# Patient Record
Sex: MALE | Race: OTHER | NOT HISPANIC OR LATINO
[De-identification: names, ages, dates, MRNs, and addresses within clinical notes are randomized per-mention and may not be internally consistent; named-entity substitution may affect disease eponyms.]

---

## 2018-05-12 PROBLEM — Z00.00 ENCOUNTER FOR PREVENTIVE HEALTH EXAMINATION: Status: ACTIVE | Noted: 2018-05-12

## 2018-06-12 ENCOUNTER — APPOINTMENT (OUTPATIENT)
Dept: UROLOGY | Facility: CLINIC | Age: 73
End: 2018-06-12
Payer: MEDICARE

## 2018-06-12 VITALS
SYSTOLIC BLOOD PRESSURE: 146 MMHG | HEART RATE: 90 BPM | WEIGHT: 195 LBS | DIASTOLIC BLOOD PRESSURE: 99 MMHG | BODY MASS INDEX: 29.55 KG/M2 | HEIGHT: 68 IN

## 2018-06-12 DIAGNOSIS — Z78.9 OTHER SPECIFIED HEALTH STATUS: ICD-10-CM

## 2018-06-12 PROCEDURE — 99203 OFFICE O/P NEW LOW 30 MIN: CPT

## 2018-06-12 RX ORDER — MEFLOQUINE HYDROCHLORIDE 250 MG/1
250 TABLET ORAL
Qty: 8 | Refills: 0 | Status: ACTIVE | COMMUNITY
Start: 2017-11-15

## 2018-06-12 RX ORDER — AMOXICILLIN 500 MG/1
500 CAPSULE ORAL
Qty: 21 | Refills: 0 | Status: COMPLETED | COMMUNITY
Start: 2018-05-21

## 2018-06-12 RX ORDER — FINASTERIDE 5 MG/1
5 TABLET, FILM COATED ORAL
Qty: 90 | Refills: 0 | Status: COMPLETED | COMMUNITY
Start: 2017-11-03

## 2018-06-12 RX ORDER — TAMSULOSIN HYDROCHLORIDE 0.4 MG/1
0.4 CAPSULE ORAL
Qty: 90 | Refills: 0 | Status: COMPLETED | COMMUNITY
Start: 2017-11-03

## 2018-06-12 NOTE — LETTER HEADER
[Rubia Benites MD] : Rubia Benites MD [Director of Urology] : Director of Urology [[address]] : [address] [[address]] : [address] [[place] [address]] : [place] [address] [Tel [phone]] : Tel [phone] [Fax [phone]] : Fax [phone]

## 2018-06-12 NOTE — LETTER BODY
[Dear  ___] : Dear  [unfilled], [Consult Letter:] : I had the pleasure of evaluating your patient, [unfilled]. [Please see my note below.] : Please see my note below. [Consult Closing:] : Thank you very much for allowing me to participate in the care of this patient.  If you have any questions, please do not hesitate to contact me. [Sincerely,] : Sincerely, [FreeTextEntry2] : Ramone Olmstead MD\par [address]\[place] [address]\par

## 2018-06-12 NOTE — ASSESSMENT
[FreeTextEntry1] : Upon exam I find him to be in fair shape and spirits. The genitalia appeared acceptable, the prostate was he enormous I could not get a finger over the top. He has acceptable rectal tone and the BC reflex was within normal limits. Both testicles are atrophic but as above he had a normal testosterone level and tells me his physical exam hasn’t changed that much since those bloods drawn. He does have a lump above the left testicle some an ultrasound is indicated. He thinks one was done by Dr. Loza in his office but it was never done by a radiologist.\par \par With respect to his elevated PSA I will get a repeat getting a free PSA well and depending on the results consider getting an MRI of the prostate. I understand he is 73 but he is a young 73 and both of his parents lived in to the ninth and 10th decade of life.\par \par With respect to his voiding symptoms we will send urine for analysis, culture and cytology, I will get a renal and bladder ultrasound, he will keep a record of his intake and output we will consider a flow study. Depending on the results we may consider various changes in his medication and/or I may recommend formal urodynamics.\par \par With respect to the lump in his left epididymis we will get an ultrasound to make sure it is a cyst as if it is solid and surgical removal may be indicated.\par

## 2018-06-12 NOTE — HISTORY OF PRESENT ILLNESS
[FreeTextEntry1] : Catarino is a pleasant 73-year-old male initially from [facility] who is been in this [place] over [place]. He seemed different urologist over time including Dr. Talley and Dr. Loza and has had various testing’s including noninvasive of Richie Mitchell` urodynamics which were non-diagnostic, a prostate biopsy which showed chronic inflammation and he’s been living with significant lower urinary tract symptoms with pharmacotherapy of finasteride and Flomax for at least five years without resolution of his symptoms. His PSA tends to run high, when he gets very high they give him antibiotics and then it comes down but even when it’s down it’s in the five – six range. There is no strong family history of cancer in general he is in good health and does not suffer from erectile dysfunction. His AUA symptom score include\par Incomplete emptying – three, frequency – three, intermittency – three, urgency – three, slow stream – three, straining – three, he wakes up three times per night giving him an AUA symptom score of 18/3/3. He is not sure if he be willing to have surgery if needed to correct this further but it’s something he would consider.\par  [Urinary Incontinence] : no urinary incontinence [Urinary Retention] : no urinary retention [Urinary Urgency] : urinary urgency [Urinary Frequency] : urinary frequency [Nocturia] : nocturia [Straining] : straining [Weak Stream] : weak stream [Intermittency] : intermittency [Post-Void Dribbling] : post-void dribbling [Erectile Dysfunction] : no Erectile Dysfunction [Dysuria] : no dysuria [Hematuria - Gross] : no gross hematuria [Hematuria - Microscopic] : no microscopic hematuria [Bladder Spasm] : no bladder spasm [Abdominal Pain] : no abdominal pain [Flank Pain] : no flank pain [Edema] : ~T edema was not present

## 2018-06-12 NOTE — PHYSICAL EXAM
[General Appearance - Well Developed] : well developed [General Appearance - Well Nourished] : well nourished [Normal Appearance] : normal appearance [Well Groomed] : well groomed [General Appearance - In No Acute Distress] : no acute distress [Heart Rate And Rhythm] : Heart rate and rhythm were normal [] : no respiratory distress [Respiration, Rhythm And Depth] : normal respiratory rhythm and effort [Exaggerated Use Of Accessory Muscles For Inspiration] : no accessory muscle use [Auscultation Breath Sounds / Voice Sounds] : lungs were clear to auscultation bilaterally [Abdomen Soft] : soft [Abdomen Tenderness] : non-tender [Abdomen Hernia] : no hernia was discovered [Costovertebral Angle Tenderness] : no ~M costovertebral angle tenderness [Urethral Meatus] : meatus normal [Penis Abnormality] : normal circumcised penis [Urinary Bladder Findings] : the bladder was normal on palpation [Scrotum] : the scrotum was normal [Testes Tenderness] : no tenderness of the testes [Testes Mass (___cm)] : there were no testicular masses [Normal Station and Gait] : the gait and station were normal for the patient's age [No Focal Deficits] : no focal deficits [FreeTextEntry1] : DTR's & BC reflexes were intact  [Oriented To Time, Place, And Person] : oriented to person, place, and time [Affect] : the affect was normal [Mood] : the mood was normal [Not Anxious] : not anxious

## 2018-06-21 ENCOUNTER — OTHER (OUTPATIENT)
Age: 73
End: 2018-06-21

## 2018-08-09 ENCOUNTER — APPOINTMENT (OUTPATIENT)
Dept: UROLOGY | Facility: CLINIC | Age: 73
End: 2018-08-09
Payer: MEDICARE

## 2018-08-09 ENCOUNTER — OUTPATIENT (OUTPATIENT)
Dept: OUTPATIENT SERVICES | Facility: HOSPITAL | Age: 73
LOS: 1 days | Discharge: HOME | End: 2018-08-09

## 2018-08-09 ENCOUNTER — LABORATORY RESULT (OUTPATIENT)
Age: 73
End: 2018-08-09

## 2018-08-09 VITALS
WEIGHT: 195 LBS | HEART RATE: 63 BPM | DIASTOLIC BLOOD PRESSURE: 90 MMHG | SYSTOLIC BLOOD PRESSURE: 155 MMHG | BODY MASS INDEX: 29.55 KG/M2 | HEIGHT: 68 IN

## 2018-08-09 LAB
BILIRUB UR QL STRIP: NORMAL
CLARITY UR: CLEAR
COLLECTION METHOD: NORMAL
GLUCOSE UR-MCNC: NORMAL
HCG UR QL: NORMAL EU/DL
HGB UR QL STRIP.AUTO: NORMAL
KETONES UR-MCNC: NORMAL
LEUKOCYTE ESTERASE UR QL STRIP: NORMAL
NITRITE UR QL STRIP: NORMAL
PH UR STRIP: 6
PROT UR STRIP-MCNC: NORMAL
SP GR UR STRIP: 1.01

## 2018-08-09 PROCEDURE — 99215 OFFICE O/P EST HI 40 MIN: CPT | Mod: 25

## 2018-08-09 PROCEDURE — 51798 US URINE CAPACITY MEASURE: CPT

## 2018-08-09 PROCEDURE — 81003 URINALYSIS AUTO W/O SCOPE: CPT | Mod: QW

## 2018-08-09 PROCEDURE — 51741 ELECTRO-UROFLOWMETRY FIRST: CPT

## 2018-08-09 NOTE — ASSESSMENT
[FreeTextEntry1] : He has poor voiding, I can’t tell in specific whether some of this is related to behavior but on a scale of zero – six he says his quality of life is impinged up to about a four or five so he is quite unhappy. He is not sure if he would want surgery in order to correct it. If we tested his bladder and found very high bladder pressures implying that if he does not have surgery he runs the risk of bladder or kidney damage he may be more inclined to do it. Therefore since he is contemplating surgery I think urodynamics as indicated here. I would first want him to bring in a record of his intake and output so if there’s something we can do with behavior modification that would obviously be better. However I don’t think that is going to be the issue. I will have him bring in a record of his intake and output and if it is not diagnostic see if we can do urodynamics the same day. As far as his scrotum I still need the scrotal ultrasound so if he can get that before he comes back in we will review that. As far as his PSA the free PSA is quite high implying this is benign disease and by PSA density even if we use 11 as the total because he’s on finasteride with the 150-160 g prostate that’s also acceptable. We’ll see what we need to do to him, depending on what we plan on doing I may have him see our urologic oncologist Dr. Sahu to voice an opinion on the PSA.

## 2018-08-09 NOTE — LETTER BODY
[Dear  ___] : Dear  [unfilled], [Courtesy Letter:] : I had the pleasure of seeing your patient, [unfilled], in my office today. [Please see my note below.] : Please see my note below. [Sincerely,] : Sincerely, [FreeTextEntry2] : Ramone Olmstead MD\par [address]\[place] [address]\par

## 2018-08-09 NOTE — LETTER HEADER
[FreeTextEntry3] : Rubia Benites M.D.\par Director of Urology\par [facility]\par [address] [place] [address], [address]\[place] [address]

## 2018-08-09 NOTE — HISTORY OF PRESENT ILLNESS
[FreeTextEntry1] : Catarino was seen June 12, 2018 for lower urinary tract symptoms, an elevated PSA and a scrotal lump. At that time the exam showed a huge prostate and there was a lump in the head of the left epididymis. The previous PSA done on May 31, 2018 had a PSA of 5.4 and testosterone level brought drawn in 2016 had a calculated free level of 84.8. At that time the prostate ultrasound showed 152 g prostate but it said he had no symptoms. We therefore sent him for a renal and bladder ultrasound to check the size of his prostate and to rule in or out signs of obstructive uropathy\par A scrotal ultrasound to look at the testicles\par He was going to keep a record of his intake and output for 24-hour’s which he forgot\par We sent urine for analysis, culture and cytology\par And depending on the results to going to get a uroflow and check his post void.\par  [Urinary Incontinence] : no urinary incontinence [Urinary Retention] : no urinary retention [Urinary Urgency] : urinary urgency [Urinary Frequency] : urinary frequency [Nocturia] : nocturia [Straining] : straining [Weak Stream] : weak stream [Intermittency] : intermittency [Post-Void Dribbling] : post-void dribbling [Erectile Dysfunction] : no Erectile Dysfunction [Dysuria] : no dysuria [Hematuria - Gross] : no gross hematuria [Hematuria - Microscopic] : no microscopic hematuria [Bladder Spasm] : no bladder spasm [Abdominal Pain] : no abdominal pain [Flank Pain] : no flank pain [Edema] : ~T edema was not present

## 2018-08-10 DIAGNOSIS — R33.9 RETENTION OF URINE, UNSPECIFIED: ICD-10-CM

## 2018-08-10 DIAGNOSIS — N40.0 BENIGN PROSTATIC HYPERPLASIA WITHOUT LOWER URINARY TRACT SYMPTOMS: ICD-10-CM

## 2018-08-10 DIAGNOSIS — N43.41 SPERMATOCELE OF EPIDIDYMIS, SINGLE: ICD-10-CM

## 2018-08-10 DIAGNOSIS — R39.15 URGENCY OF URINATION: ICD-10-CM

## 2018-08-10 DIAGNOSIS — R35.1 NOCTURIA: ICD-10-CM

## 2018-08-10 DIAGNOSIS — R39.13 SPLITTING OF URINARY STREAM: ICD-10-CM

## 2018-08-10 DIAGNOSIS — R30.0 DYSURIA: ICD-10-CM

## 2018-08-10 DIAGNOSIS — R35.0 FREQUENCY OF MICTURITION: ICD-10-CM

## 2018-08-10 DIAGNOSIS — R97.20 ELEVATED PROSTATE SPECIFIC ANTIGEN [PSA]: ICD-10-CM

## 2018-08-17 ENCOUNTER — OTHER (OUTPATIENT)
Age: 73
End: 2018-08-17

## 2018-10-10 ENCOUNTER — OUTPATIENT (OUTPATIENT)
Dept: OUTPATIENT SERVICES | Facility: HOSPITAL | Age: 73
LOS: 1 days | Discharge: HOME | End: 2018-10-10

## 2018-10-10 ENCOUNTER — APPOINTMENT (OUTPATIENT)
Dept: UROLOGY | Facility: CLINIC | Age: 73
End: 2018-10-10
Payer: MEDICARE

## 2018-10-10 ENCOUNTER — LABORATORY RESULT (OUTPATIENT)
Age: 73
End: 2018-10-10

## 2018-10-10 VITALS
SYSTOLIC BLOOD PRESSURE: 151 MMHG | HEART RATE: 98 BPM | DIASTOLIC BLOOD PRESSURE: 76 MMHG | HEIGHT: 68 IN | WEIGHT: 195 LBS | BODY MASS INDEX: 29.55 KG/M2

## 2018-10-10 DIAGNOSIS — R39.15 URGENCY OF URINATION: ICD-10-CM

## 2018-10-10 DIAGNOSIS — N43.41 SPERMATOCELE OF EPIDIDYMIS, SINGLE: ICD-10-CM

## 2018-10-10 DIAGNOSIS — R97.20 ELEVATED PROSTATE SPECIFIC ANTIGEN [PSA]: ICD-10-CM

## 2018-10-10 DIAGNOSIS — R35.0 FREQUENCY OF MICTURITION: ICD-10-CM

## 2018-10-10 DIAGNOSIS — R39.13 SPLITTING OF URINARY STREAM: ICD-10-CM

## 2018-10-10 DIAGNOSIS — R35.1 NOCTURIA: ICD-10-CM

## 2018-10-10 DIAGNOSIS — R30.0 DYSURIA: ICD-10-CM

## 2018-10-10 DIAGNOSIS — R33.9 RETENTION OF URINE, UNSPECIFIED: ICD-10-CM

## 2018-10-10 DIAGNOSIS — R97.20 ELEVATED PROSTATE, SPECIFIC ANTIGEN [PSA]: ICD-10-CM

## 2018-10-10 DIAGNOSIS — N40.0 BENIGN PROSTATIC HYPERPLASIA WITHOUT LOWER URINARY TRACT SYMPTOMS: ICD-10-CM

## 2018-10-10 LAB
BILIRUB UR QL STRIP: NORMAL
CLARITY UR: CLEAR
COLLECTION METHOD: NORMAL
GLUCOSE UR-MCNC: NORMAL
HCG UR QL: NORMAL EU/DL
HGB UR QL STRIP.AUTO: NORMAL
KETONES UR-MCNC: NORMAL
LEUKOCYTE ESTERASE UR QL STRIP: 25
NITRITE UR QL STRIP: NORMAL
PH UR STRIP: 5
PROT UR STRIP-MCNC: NORMAL
SP GR UR STRIP: 1.01

## 2018-10-10 PROCEDURE — 51741 ELECTRO-UROFLOWMETRY FIRST: CPT

## 2018-10-10 PROCEDURE — 51797 INTRAABDOMINAL PRESSURE TEST: CPT

## 2018-10-10 PROCEDURE — 81003 URINALYSIS AUTO W/O SCOPE: CPT | Mod: QW

## 2018-10-10 PROCEDURE — 99215 OFFICE O/P EST HI 40 MIN: CPT | Mod: 25

## 2018-10-10 PROCEDURE — 51784 ANAL/URINARY MUSCLE STUDY: CPT

## 2018-10-10 PROCEDURE — 51798 US URINE CAPACITY MEASURE: CPT

## 2018-10-10 PROCEDURE — 51728 CYSTOMETROGRAM W/VP: CPT

## 2018-10-10 NOTE — LETTER BODY
[Dear  ___] : Dear  [unfilled], [Courtesy Letter:] : I had the pleasure of seeing your patient, [unfilled], in my office today. [Please see my note below.] : Please see my note below. [Sincerely,] : Sincerely, [FreeTextEntry2] : Ramone Olmstead MD\par [address]\[place] [address]

## 2018-10-10 NOTE — ASSESSMENT
[FreeTextEntry1] : We reviewed his scrotal ultrasound, voiding diary, and urodynamic testing.\par \par Hisscrotal sonogram is relatively unremarkable without any evidence of testicular mass.\par \par With regard to his voiding symptoms he’s drinking fair amounts of fluid, putting a fair volumes but we know from prior studies that he is not emptying. We proceeded to urodynamic testing and please see that note for specifics. As a totality it reveals significantly high voiding pressures, 170 peak detrusor pressure with decreased force of urinary stream. He appeared to be having spasms as just placing the catheter had him screaming and breaking out in a sweat. When he started to urinate he screened from the pain it was causing. \par \par He has high-grade obstruction with risk over time to his bladder wall as the high pressures can cause hypoxia leading to fibrosis as well as possibility of back pressure changes to his kidneys. There is no evidence of bladder instability on urodynamic testing and he is already on maximal medical therapy without much change. At this point, I recommend he have an open prostatectomy. Therefore we have recommended follow up with Dr. Kevin Day for consultation regarding a robotic assisted simple prostatectomy. All risks/benefits and options were reviewed, including adverse events of this procedure. He understands that if he does not pursue this option regarding his extremely elevated voiding pressures he has a high probability of causing damage his kidney and bladder. He understands the risks involved and will follow up with Dr. Day for consultation.\par \par \par \par

## 2018-10-10 NOTE — LETTER HEADER
[FreeTextEntry3] : Rubia Benites M.D.\par Director of Urology\par [facility]\par [address] [place] [address], [address]\[place] [address]

## 2018-10-10 NOTE — PHYSICAL EXAM
[General Appearance - Well Developed] : well developed [General Appearance - Well Nourished] : well nourished [Normal Appearance] : normal appearance [Well Groomed] : well groomed [General Appearance - In No Acute Distress] : no acute distress [Abdomen Soft] : soft [Abdomen Tenderness] : non-tender [Costovertebral Angle Tenderness] : no ~M costovertebral angle tenderness [Urethral Meatus] : meatus normal [Penis Abnormality] : normal circumcised penis [Urinary Bladder Findings] : the bladder was normal on palpation [Scrotum] : the scrotum was normal [No Prostate Nodules] : no prostate nodules [Edema] : no peripheral edema [] : no respiratory distress [Respiration, Rhythm And Depth] : normal respiratory rhythm and effort [Exaggerated Use Of Accessory Muscles For Inspiration] : no accessory muscle use [Oriented To Time, Place, And Person] : oriented to person, place, and time [Affect] : the affect was normal [Mood] : the mood was normal [Not Anxious] : not anxious [Normal Station and Gait] : the gait and station were normal for the patient's age [No Focal Deficits] : no focal deficits

## 2018-10-10 NOTE — HISTORY OF PRESENT ILLNESS
[Urinary Urgency] : urinary urgency [Urinary Frequency] : urinary frequency [Nocturia] : nocturia [Straining] : straining [Weak Stream] : weak stream [Intermittency] : intermittency [Post-Void Dribbling] : post-void dribbling [FreeTextEntry1] : Catarino Is a 73-year-old male who we have been following for BPH with elevated PSA and possible urinary tract symptoms as well as a scrotal lump. Prior ultrasound revealed an approximate 152 g prostate. His PSA, while elevated at 5.4, results in a normal PSA density. Additionally, tells me that he had at least two prior prostate biopsies by Dr. Talley that had shown prostatitis. \par He had also complained about a non-painful lump on his scrotum that was sent for imaging. He presents today to review voiding diary, scrotal sonogram, and probably urodynamic testing.\par  [Erectile Dysfunction] : no Erectile Dysfunction [Dysuria] : no dysuria [Hematuria - Gross] : no gross hematuria [Hematuria - Microscopic] : no microscopic hematuria [Bladder Spasm] : no bladder spasm [Abdominal Pain] : no abdominal pain [Flank Pain] : no flank pain [Edema] : ~T edema was not present

## 2018-10-29 RX ORDER — FINASTERIDE 5 MG/1
5 TABLET, FILM COATED ORAL DAILY
Qty: 90 | Refills: 3 | Status: ACTIVE | COMMUNITY
Start: 1900-01-01 | End: 1900-01-01

## 2018-10-29 RX ORDER — TAMSULOSIN HYDROCHLORIDE 0.4 MG/1
0.4 CAPSULE ORAL
Qty: 90 | Refills: 3 | Status: ACTIVE | COMMUNITY
Start: 1900-01-01 | End: 1900-01-01

## 2018-11-12 ENCOUNTER — APPOINTMENT (OUTPATIENT)
Dept: UROLOGY | Facility: CLINIC | Age: 73
End: 2018-11-12
Payer: MEDICARE

## 2018-11-12 VITALS
HEIGHT: 68 IN | DIASTOLIC BLOOD PRESSURE: 87 MMHG | WEIGHT: 195 LBS | HEART RATE: 87 BPM | SYSTOLIC BLOOD PRESSURE: 169 MMHG | BODY MASS INDEX: 29.55 KG/M2

## 2018-11-12 DIAGNOSIS — N40.0 BENIGN PROSTATIC HYPERPLASIA WITHOUT LOWER URINARY TRACT SYMPMS: ICD-10-CM

## 2018-11-12 DIAGNOSIS — R39.13 SPLITTING OF URINARY STREAM: ICD-10-CM

## 2018-11-12 DIAGNOSIS — R35.1 NOCTURIA: ICD-10-CM

## 2018-11-12 DIAGNOSIS — R35.0 FREQUENCY OF MICTURITION: ICD-10-CM

## 2018-11-12 PROCEDURE — 99215 OFFICE O/P EST HI 40 MIN: CPT

## 2018-11-12 RX ORDER — CEFUROXIME AXETIL 250 MG/1
250 TABLET ORAL
Qty: 10 | Refills: 0 | Status: ACTIVE | COMMUNITY
Start: 2018-10-10 | End: 1900-01-01

## 2019-04-15 ENCOUNTER — APPOINTMENT (OUTPATIENT)
Dept: UROLOGY | Facility: CLINIC | Age: 74
End: 2019-04-15

## 2020-01-06 ENCOUNTER — RX RENEWAL (OUTPATIENT)
Age: 75
End: 2020-01-06